# Patient Record
Sex: MALE | Race: WHITE
[De-identification: names, ages, dates, MRNs, and addresses within clinical notes are randomized per-mention and may not be internally consistent; named-entity substitution may affect disease eponyms.]

---

## 2017-07-11 ENCOUNTER — HOSPITAL ENCOUNTER (EMERGENCY)
Dept: HOSPITAL 62 - ER | Age: 67
Discharge: HOME | End: 2017-07-11
Payer: COMMERCIAL

## 2017-07-11 VITALS — DIASTOLIC BLOOD PRESSURE: 79 MMHG | SYSTOLIC BLOOD PRESSURE: 140 MMHG
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
## 2017-07-11 DIAGNOSIS — M54.5: ICD-10-CM

## 2017-07-11 DIAGNOSIS — S10.93XA: Primary | ICD-10-CM

## 2017-07-11 LAB
ALBUMIN SERPL-MCNC: 4.5 G/DL (ref 3.5–5)
ALP SERPL-CCNC: 94 U/L (ref 38–126)
ALT SERPL-CCNC: 38 U/L (ref 21–72)
ANION GAP SERPL CALC-SCNC: 12 MMOL/L (ref 5–19)
AST SERPL-CCNC: 32 U/L (ref 17–59)
BASOPHILS # BLD AUTO: 0 10^3/UL (ref 0–0.2)
BASOPHILS NFR BLD AUTO: 0.3 % (ref 0–2)
BILIRUB DIRECT SERPL-MCNC: 0.3 MG/DL (ref 0–0.4)
BILIRUB SERPL-MCNC: 0.6 MG/DL (ref 0.2–1.3)
BUN SERPL-MCNC: 24 MG/DL (ref 7–20)
CALCIUM: 9.6 MG/DL (ref 8.4–10.2)
CHLORIDE SERPL-SCNC: 103 MMOL/L (ref 98–107)
CO2 SERPL-SCNC: 24 MMOL/L (ref 22–30)
CREAT SERPL-MCNC: 1.11 MG/DL (ref 0.52–1.25)
EOSINOPHIL # BLD AUTO: 0.4 10^3/UL (ref 0–0.6)
EOSINOPHIL NFR BLD AUTO: 3.8 % (ref 0–6)
ERYTHROCYTE [DISTWIDTH] IN BLOOD BY AUTOMATED COUNT: 13.9 % (ref 11.5–14)
GLUCOSE SERPL-MCNC: 163 MG/DL (ref 75–110)
HCT VFR BLD CALC: 39.2 % (ref 37.9–51)
HGB BLD-MCNC: 13.2 G/DL (ref 13.5–17)
HGB HCT DIFFERENCE: 0.4
LYMPHOCYTES # BLD AUTO: 0.9 10^3/UL (ref 0.5–4.7)
LYMPHOCYTES NFR BLD AUTO: 8.8 % (ref 13–45)
MCH RBC QN AUTO: 29.8 PG (ref 27–33.4)
MCHC RBC AUTO-ENTMCNC: 33.7 G/DL (ref 32–36)
MCV RBC AUTO: 88 FL (ref 80–97)
MONOCYTES # BLD AUTO: 0.7 10^3/UL (ref 0.1–1.4)
MONOCYTES NFR BLD AUTO: 6.8 % (ref 3–13)
NEUTROPHILS # BLD AUTO: 8.6 10^3/UL (ref 1.7–8.2)
NEUTS SEG NFR BLD AUTO: 80.3 % (ref 42–78)
POTASSIUM SERPL-SCNC: 4.7 MMOL/L (ref 3.6–5)
PROT SERPL-MCNC: 8.1 G/DL (ref 6.3–8.2)
RBC # BLD AUTO: 4.44 10^6/UL (ref 4.35–5.55)
SODIUM SERPL-SCNC: 138.5 MMOL/L (ref 137–145)
WBC # BLD AUTO: 10.7 10^3/UL (ref 4–10.5)

## 2017-07-11 PROCEDURE — 70450 CT HEAD/BRAIN W/O DYE: CPT

## 2017-07-11 PROCEDURE — 72125 CT NECK SPINE W/O DYE: CPT

## 2017-07-11 PROCEDURE — 71260 CT THORAX DX C+: CPT

## 2017-07-11 PROCEDURE — 74177 CT ABD & PELVIS W/CONTRAST: CPT

## 2017-07-11 PROCEDURE — 80053 COMPREHEN METABOLIC PANEL: CPT

## 2017-07-11 PROCEDURE — 85025 COMPLETE CBC W/AUTO DIFF WBC: CPT

## 2017-07-11 PROCEDURE — 36415 COLL VENOUS BLD VENIPUNCTURE: CPT

## 2017-07-11 PROCEDURE — 99284 EMERGENCY DEPT VISIT MOD MDM: CPT
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
## 2018-04-04 ENCOUNTER — HOSPITAL ENCOUNTER (OUTPATIENT)
Dept: HOSPITAL 62 - SP | Age: 68
End: 2018-04-04
Attending: INTERNAL MEDICINE
Payer: MEDICARE

## 2018-04-04 DIAGNOSIS — R22.43: Primary | ICD-10-CM

## 2018-04-04 PROCEDURE — 93970 EXTREMITY STUDY: CPT

## 2018-04-04 NOTE — RADIOLOGY REPORT (SQ)
EXAM DESCRIPTION:  VENOUS BILATERAL LOWER



COMPLETED DATE/TIME:  4/4/2018 11:52 am



REASON FOR STUDY:  SWELLING R22.43  LOCALIZED SWELLING, MASS AND LUMP, LOWER LIMB, BILATE



COMPARISON:  None.



TECHNIQUE:  Dynamic and static gray scale and color images acquired of both lower extremity venous sy
stems. Selected spectral images acquired with additional compression and augmentation maneuvers. Imag
es stored on PACS.



LIMITATIONS:  None.



FINDINGS:  RIGHT LEG

COMMON FEMORAL AND FEMORAL: Normal phasicity, compression and augmentation. No visualized echogenic m
aterial on gray scale. No defects on color images.

POPLITEAL: Normal compression and augmentation. No visualized echogenic material on gray scale. No de
fects on color images.

CALF VESSELS: Normal compression and augmentation. No visualized echogenic material on gray scale. No
 defects on color image.

GSV AND SSV: Normal compression. No visualized echogenic material on gray scale. No defects on color 
images.

ANY DEEP VENOUS INSUFFICIENCY: Not evaluated.

ANY EVIDENCE OF POPLITEAL CYST: No.

OTHER: No other significant finding.

LEFT LEG

COMMON FEMORAL AND FEMORAL: Normal phasicity, compression and augmentation. No visualized echogenic m
aterial on gray scale. No defects on color images.

POPLITEAL: Normal compression and augmentation. No visualized echogenic material on gray scale. No de
fects on color images.

CALF VESSELS: Normal compression and augmentation. No visualized echogenic material on gray scale. No
 defects on color images.

GSV AND SSV: Normal compression. No visualized echogenic material on gray scale. No defects on color 
images.

ANY DEEP VENOUS INSUFFICIENCY: Not evaluated.

ANY EVIDENCE POPLITEAL CYST: No.

OTHER: No other significant finding.



IMPRESSION:  NO EVIDENCE DVT OR SVT IN EITHER LEG.



TECHNICAL DOCUMENTATION:  JOB ID:  [id number]

 2011 [employer]- All Rights Reserved



Reading location - IP/workstation name: [email or web address]

## 2018-07-08 ENCOUNTER — HOSPITAL ENCOUNTER (EMERGENCY)
Dept: HOSPITAL 62 - ER | Age: 68
Discharge: HOME | End: 2018-07-08
Payer: MEDICARE

## 2018-07-08 VITALS — SYSTOLIC BLOOD PRESSURE: 93 MMHG | DIASTOLIC BLOOD PRESSURE: 77 MMHG

## 2018-07-08 DIAGNOSIS — L27.0: Primary | ICD-10-CM

## 2018-07-08 DIAGNOSIS — I25.10: ICD-10-CM

## 2018-07-08 DIAGNOSIS — R06.02: ICD-10-CM

## 2018-07-08 DIAGNOSIS — E10.9: ICD-10-CM

## 2018-07-08 DIAGNOSIS — Z87.891: ICD-10-CM

## 2018-07-08 DIAGNOSIS — I50.9: ICD-10-CM

## 2018-07-08 DIAGNOSIS — I11.0: ICD-10-CM

## 2018-07-08 DIAGNOSIS — T36.4X5A: ICD-10-CM

## 2018-07-08 LAB
ADD MANUAL DIFF: NO
ALBUMIN SERPL-MCNC: 4.2 G/DL (ref 3.5–5)
ALP SERPL-CCNC: 110 U/L (ref 38–126)
ALT SERPL-CCNC: 20 U/L (ref 21–72)
ANION GAP SERPL CALC-SCNC: 17 MMOL/L (ref 5–19)
AST SERPL-CCNC: 33 U/L (ref 17–59)
BASOPHILS # BLD AUTO: 0 10^3/UL (ref 0–0.2)
BASOPHILS NFR BLD AUTO: 0.3 % (ref 0–2)
BILIRUB DIRECT SERPL-MCNC: 1 MG/DL (ref 0–0.4)
BILIRUB SERPL-MCNC: 1.5 MG/DL (ref 0.2–1.3)
BUN SERPL-MCNC: 96 MG/DL (ref 7–20)
CALCIUM: 9.9 MG/DL (ref 8.4–10.2)
CHLORIDE SERPL-SCNC: 100 MMOL/L (ref 98–107)
CO2 SERPL-SCNC: 23 MMOL/L (ref 22–30)
EOSINOPHIL # BLD AUTO: 0.1 10^3/UL (ref 0–0.6)
EOSINOPHIL NFR BLD AUTO: 1.5 % (ref 0–6)
ERYTHROCYTE [DISTWIDTH] IN BLOOD BY AUTOMATED COUNT: 20 % (ref 11.5–14)
GLUCOSE SERPL-MCNC: 188 MG/DL (ref 75–110)
HCT VFR BLD CALC: 38.8 % (ref 37.9–51)
HGB BLD-MCNC: 12.9 G/DL (ref 13.5–17)
LYMPHOCYTES # BLD AUTO: 0.5 10^3/UL (ref 0.5–4.7)
LYMPHOCYTES NFR BLD AUTO: 5.1 % (ref 13–45)
MCH RBC QN AUTO: 28 PG (ref 27–33.4)
MCHC RBC AUTO-ENTMCNC: 33.2 G/DL (ref 32–36)
MCV RBC AUTO: 84 FL (ref 80–97)
MONOCYTES # BLD AUTO: 0.5 10^3/UL (ref 0.1–1.4)
MONOCYTES NFR BLD AUTO: 5.5 % (ref 3–13)
NEUTROPHILS # BLD AUTO: 8.2 10^3/UL (ref 1.7–8.2)
NEUTS SEG NFR BLD AUTO: 87.6 % (ref 42–78)
PLATELET # BLD: 168 10^3/UL (ref 150–450)
POTASSIUM SERPL-SCNC: 4.8 MMOL/L (ref 3.6–5)
PROT SERPL-MCNC: 9 G/DL (ref 6.3–8.2)
RBC # BLD AUTO: 4.59 10^6/UL (ref 4.35–5.55)
SODIUM SERPL-SCNC: 139.8 MMOL/L (ref 137–145)
TOTAL CELLS COUNTED % (AUTO): 100 %
WBC # BLD AUTO: 9.4 10^3/UL (ref 4–10.5)

## 2018-07-08 PROCEDURE — 71045 X-RAY EXAM CHEST 1 VIEW: CPT

## 2018-07-08 PROCEDURE — 85025 COMPLETE CBC W/AUTO DIFF WBC: CPT

## 2018-07-08 PROCEDURE — 36415 COLL VENOUS BLD VENIPUNCTURE: CPT

## 2018-07-08 PROCEDURE — 96374 THER/PROPH/DIAG INJ IV PUSH: CPT

## 2018-07-08 PROCEDURE — 80053 COMPREHEN METABOLIC PANEL: CPT

## 2018-07-08 PROCEDURE — 96361 HYDRATE IV INFUSION ADD-ON: CPT

## 2018-07-08 PROCEDURE — 99284 EMERGENCY DEPT VISIT MOD MDM: CPT

## 2018-07-08 NOTE — RADIOLOGY REPORT (SQ)
EXAM DESCRIPTION:  CHEST SINGLE VIEW



COMPLETED DATE/TIME:  7/8/2018 4:46 pm



REASON FOR STUDY:  SOB



COMPARISON:  7/11/2017



EXAM PARAMETERS:  NUMBER OF VIEWS: One view.

TECHNIQUE: Single frontal radiographic view of the chest acquired.

RADIATION DOSE: NA

LIMITATIONS: None.



FINDINGS:  LUNGS AND PLEURA: Blunting and hazy opacification of the left costophrenic angle.  No foca
l consolidation.  No pneumothorax.

MEDIASTINUM AND HILAR STRUCTURES: No masses.  Contour normal.

HEART AND VASCULAR STRUCTURES: Borderline cardiomegaly.  Central vasculature appears normal.

BONES: No acute findings.

HARDWARE: None in the chest.

OTHER: No other significant finding.



IMPRESSION:  Left lung base findings are nonspecific, and may represent a developing pneumonitis, sca
rring, and/or a small pleural effusion.



TECHNICAL DOCUMENTATION:  JOB ID:  [id number]

 2011 [employer]- All Rights Reserved



Reading location - IP/workstation name: [email or web address]

## 2018-07-08 NOTE — ER DOCUMENT REPORT
ED General





- General


Chief Complaint: Allergic Reaction


Stated Complaint: POSSIBLE ALLERGIC REACTION


Time Seen by Provider: 07/08/18 15:58


Mode of Arrival: Ambulatory


Information source: Patient, Relative, Dr. Office, [facility] Records


Notes: 





67-year-old male with congestive heart failure, coronary artery disease diabetes

, hypertension presents with complaint of rash that started 1 day prior to 

arrival.  Patient was placed on doxycycline for urinary tract infection 5 days 

prior to arrival.  Daughter is at the bedside and states that yesterday patient 

developed a rash on his chest, back and upper legs.  She immediately stopped 

the antibiotic and administered Benadryl.  Today the rash worsened and patient 

had associated shortness of breath which has now resolved.  Patient's urologist 

Dr. Brown advised to be seen in the emergency department.  Patient was now 

placed on Bactrim.  Patient denies difficulty swallowing, chest and abdominal 

pain.  Patient is due to be admitted at [facility] tomorrow in preparation for an LVAD.


TRAVEL OUTSIDE OF THE [place] IN LAST 30 DAYS: No





- HPI


Onset: Yesterday


Onset/Duration: Gradual, Persistent, Worse


Quality of pain: No pain


Severity: None


Associated symptoms: Shortness of breath


Exacerbated by: Denies


Relieved by: Denies


Similar symptoms previously: No


Recently seen / treated by doctor: Yes





- Related Data


Allergies/Adverse Reactions: 


 





doxycycline Allergy (Verified 07/08/18 16:39)


 











Past Medical History





- General


Information source: Patient, Relative, Dr. Office, [facility] Records





- Social History


Smoking Status: Former Smoker


Chew tobacco use (# tins/day): No


Frequency of alcohol use: None


Drug Abuse: None


Lives with: Family


Family History: Reviewed & Not Pertinent


Patient has suicidal ideation: No


Patient has homicidal ideation: No





- Past Medical History


Cardiac Medical History: Reports: Hx Congestive Heart Failure, Hx Coronary 

Artery Disease, Hx Hypertension


Endocrine Medical History: Reports: Hx Diabetes Mellitus Type 1


Renal/ Medical History: Denies: Hx Peritoneal Dialysis





- Immunizations


Hx Diphtheria, Pertussis, Tetanus Vaccination: No


Hx Pneumococcal Vaccination: 08/21/13





Review of Systems





- Review of Systems


Constitutional: Malaise, Weakness - Ongoing


EENT: No symptoms reported


Cardiovascular: denies: Syncope


Respiratory: Short of breath - Resolved, Wheezing


Gastrointestinal: No symptoms reported


Genitourinary: No symptoms reported


Male Genitourinary: No symptoms reported


Musculoskeletal: No symptoms reported


Skin: Rash


Hematologic/Lymphatic: No symptoms reported


Neurological/Psychological: denies: Lost consciousness, Headaches


-: Yes All other systems reviewed and negative





Physical Exam





- Vital signs


Vitals: 


 











Pulse Resp BP Pulse Ox


 


 107 H  16   93/65 L  100 


 


 07/08/18 15:41  07/08/18 15:41  07/08/18 15:41  07/08/18 15:41














- Notes


Notes: 





PHYSICAL EXAMINATION:





GENERAL: Well-appearing, well-nourished and in no acute distress.





HEAD: Atraumatic, normocephalic.





EYES: Pupils equal round and reactive to light, extraocular movements intact, 

sclera anicteric, conjunctiva are normal.





ENT: Nares patent, oropharynx clear without exudates.  Moist mucous membranes.  

No oral lesions 





NECK: Normal range of motion, supple without lymphadenopathy





LUNGS: Breath sounds clear to auscultation bilaterally and equal.  No wheezes 

rales or rhonchi.





HEART: Regular rate and rhythm without murmurs





ABDOMEN: Soft, nontender, nondistended abdomen.  No guarding, no rebound.  No 

masses appreciated.





Musculoskeletal: Normal range of motion, no pitting or edema.  No cyanosis.





NEUROLOGICAL: Cranial nerves grossly intact.  Normal speech, normal gait.  

Normal sensory, motor exams 





PSYCH: Normal mood, normal affect.





SKIN: Diffuse urticaria of the trunk, consistent with drug rash, no skin 

sloughing





Course





- Re-evaluation


Re-evalutation: 








Laboratory











  07/08/18 07/08/18





  16:10 16:10


 


WBC  9.4 


 


RBC  4.59 


 


Hgb  12.9 L 


 


Hct  38.8 


 


MCV  84 


 


MCH  28.0 


 


MCHC  33.2 


 


RDW  20.0 H 


 


Plt Count  168 


 


Seg Neutrophils %  87.6 H 


 


Lymphocytes %  5.1 L 


 


Monocytes %  5.5 


 


Eosinophils %  1.5 


 


Basophils %  0.3 


 


Absolute Neutrophils  8.2 


 


Absolute Lymphocytes  0.5 


 


Absolute Monocytes  0.5 


 


Absolute Eosinophils  0.1 


 


Absolute Basophils  0.0 


 


Sodium   139.8


 


Potassium   4.8


 


Chloride   100


 


Carbon Dioxide   23


 


Anion Gap   17


 


BUN   96 H


 


Creatinine   1.26 H


 


Est GFR ( Amer)   > 60


 


Est GFR (Non-Af Amer)   57 L


 


Glucose   188 H


 


Calcium   9.9


 


Total Bilirubin   1.5 H


 


Direct Bilirubin   1.0 H


 


Neonat Total Bilirubin   Not Reportable


 


Neonat Direct Bilirubin   Not Reportable


 


Neonat Indirect Bili   Not Reportable


 


AST   33


 


ALT   20 L


 


Alkaline Phosphatase   110


 


Total Protein   9.0 H


 


Albumin   4.2














 





Chest X-Ray  07/08/18 16:15


IMPRESSION:  Left lung base findings are nonspecific, and may represent a 

developing pneumonitis, scarring, and/or a small pleural effusion.


 











07/08/18 22:51


67-year-old male with congestive heart failure, coronary artery disease diabetes

, hypertension presents with complaint of rash that started 1 day prior to 

arrival.  Patient was placed on doxycycline for urinary tract infection 5 days 

prior to arrival.  Daughter is at the bedside and states that yesterday patient 

developed a rash on his chest, back and upper legs.  She immediately stopped 

the antibiotic and administered Benadryl.  Today the rash worsened and patient 

had associated shortness of breath which has now resolved.  Patient's urologist 

Dr. Brown advised to be seen in the emergency department.  Patient was now 

placed on Bactrim.  Patient denies difficulty swallowing, chest and abdominal 

pain.  Patient is due to be admitted at [facility] tomorrow in preparation for an 

LVAD.  Patient received Benadryl, prednisone, IV fluids during his ED course.  

On reevaluation he reports an improvement in his shortness of breath.  Rash is 

still present.  No evidence of Maier-Jimmie's.  Patient provided the 

opportunity to ask questions, and express concerns.  Discharge instructions 

discussed. Patient is agreeable with discharge home.  Return indications 

explained and discussed with the patient who displays understanding.  Patient 

encouraged to return to the emergency department immediately with any concerns.


07/08/18 22:52








- Vital Signs


Vital signs: 


 











Temp Pulse Resp BP Pulse Ox


 


    107 H  19   93/77 L  92 


 


    07/08/18 15:41  07/08/18 18:01  07/08/18 18:01  07/08/18 18:01














- Laboratory


Result Diagrams: 


 07/08/18 16:10





 07/08/18 16:10


Laboratory results interpreted by me: 


 











  07/08/18 07/08/18





  16:10 16:10


 


Hgb  12.9 L 


 


RDW  20.0 H 


 


Seg Neutrophils %  87.6 H 


 


Lymphocytes %  5.1 L 


 


BUN   96 H


 


Creatinine   1.26 H


 


Est GFR (Non-Af Amer)   57 L


 


Glucose   188 H


 


Total Bilirubin   1.5 H


 


Direct Bilirubin   1.0 H


 


ALT   20 L


 


Total Protein   9.0 H














- Diagnostic Test


Radiology reviewed: Image reviewed, Reports reviewed





Discharge





- Discharge


Clinical Impression: 


 Drug rash





Allergic reaction


Qualifiers:


 Encounter type: initial encounter Qualified Code(s): T78.40XA - Allergy, 

unspecified, initial encounter





Condition: Good


Disposition: HOME, SELF-CARE


Instructions:  Acute Allergic Reaction ([facility]), Acute Allergic Reaction to Drugs (

[facility])


Additional Instructions: 


Follow up with your physician tomorrow for further care or return to the ED 

IMMEDIATELY if symptoms worsen or new concerns occur. If you cannot afford to 

follow up with your primary care physician a list of low cost clinics have been 

provided at the end of your discharge papers as well.


Referrals: 


FELICIA TRUJILLO MD [Primary Care Provider] - Follow up as needed

## 2018-07-08 NOTE — ER DOCUMENT REPORT
ED Medical Screen (RME)





- General


Chief Complaint: Allergic Reaction


Stated Complaint: POSSIBLE ALLERGIC REACTION


Time Seen by Provider: 07/08/18 15:58


Notes: 


Patient is a 67-year-old male presents with 1 day of a diffuse rash on his 

chest wall, back and abdomen after starting doxycycline for a UTI.  He took 

Benadryl 6 hours ago.  [facility]  Denies 

palms, mouth involvement or foot involvement.





PE: Diffuse macular rash. No desquamation of hands or mouth. Tachycardia.





I have greeted and performed a rapid initial assessment of this patient.  A 

comprehensive ED assessment and evaluation of the patient, analysis of test 

results and completion of the medical decision making process will be conducted 

by additional ED providers.


TRAVEL OUTSIDE OF THE [place] IN LAST 30 DAYS: No





- Related Data


Allergies/Adverse Reactions: 


 





No Known Allergies Allergy (Verified 07/08/18 15:36)


 











Past Medical History





- Social History


Chew tobacco use (# tins/day): No


Frequency of alcohol use: None


Drug Abuse: None





- Past Medical History


Cardiac Medical History: Reports: Hx Congestive Heart Failure


Renal/ Medical History: Denies: Hx Peritoneal Dialysis





- Immunizations


Hx Diphtheria, Pertussis, Tetanus Vaccination: No





Physical Exam





- Vital signs


Vitals: 


 











Pulse Resp BP Pulse Ox


 


 107 H  16   93/65 L  100 


 


 07/08/18 15:41  07/08/18 15:41  07/08/18 15:41  07/08/18 15:41














Course





- Vital Signs


Vital signs: 


 











Temp Pulse Resp BP Pulse Ox


 


    107 H  16   93/65 L  100 


 


    07/08/18 15:41  07/08/18 15:41  07/08/18 15:41  07/08/18 15:41














Doctor's Discharge





- Discharge


Referrals: 


FELICIA TRUJILLO MD [Primary Care Provider] - Follow up as needed

## 2018-08-04 ENCOUNTER — HOSPITAL ENCOUNTER (EMERGENCY)
Dept: HOSPITAL 62 - ER | Age: 68
Discharge: ANOTHER HOSPITAL | End: 2018-08-04
Payer: MEDICARE

## 2018-08-04 VITALS — SYSTOLIC BLOOD PRESSURE: 93 MMHG | DIASTOLIC BLOOD PRESSURE: 66 MMHG

## 2018-08-04 DIAGNOSIS — I25.10: ICD-10-CM

## 2018-08-04 DIAGNOSIS — N17.9: ICD-10-CM

## 2018-08-04 DIAGNOSIS — I11.0: Primary | ICD-10-CM

## 2018-08-04 DIAGNOSIS — Z88.1: ICD-10-CM

## 2018-08-04 DIAGNOSIS — I50.9: ICD-10-CM

## 2018-08-04 DIAGNOSIS — R53.1: ICD-10-CM

## 2018-08-04 DIAGNOSIS — R06.02: ICD-10-CM

## 2018-08-04 DIAGNOSIS — Z95.810: ICD-10-CM

## 2018-08-04 LAB
ADD MANUAL DIFF: NO
ANION GAP SERPL CALC-SCNC: 20 MMOL/L (ref 5–19)
BASOPHILS # BLD AUTO: 0 10^3/UL (ref 0–0.2)
BASOPHILS NFR BLD AUTO: 0.6 % (ref 0–2)
BUN SERPL-MCNC: 139 MG/DL (ref 7–20)
CALCIUM: 9.3 MG/DL (ref 8.4–10.2)
CHLORIDE SERPL-SCNC: 94 MMOL/L (ref 98–107)
CO2 SERPL-SCNC: 21 MMOL/L (ref 22–30)
EOSINOPHIL # BLD AUTO: 0.2 10^3/UL (ref 0–0.6)
EOSINOPHIL NFR BLD AUTO: 2.1 % (ref 0–6)
ERYTHROCYTE [DISTWIDTH] IN BLOOD BY AUTOMATED COUNT: 21.1 % (ref 11.5–14)
GLUCOSE SERPL-MCNC: 137 MG/DL (ref 75–110)
HCT VFR BLD CALC: 32.7 % (ref 37.9–51)
HGB BLD-MCNC: 10.6 G/DL (ref 13.5–17)
LYMPHOCYTES # BLD AUTO: 0.4 10^3/UL (ref 0.5–4.7)
LYMPHOCYTES NFR BLD AUTO: 5.2 % (ref 13–45)
MCH RBC QN AUTO: 28.9 PG (ref 27–33.4)
MCHC RBC AUTO-ENTMCNC: 32.5 G/DL (ref 32–36)
MCV RBC AUTO: 89 FL (ref 80–97)
MONOCYTES # BLD AUTO: 0.7 10^3/UL (ref 0.1–1.4)
MONOCYTES NFR BLD AUTO: 8.8 % (ref 3–13)
NEUTROPHILS # BLD AUTO: 6.3 10^3/UL (ref 1.7–8.2)
NEUTS SEG NFR BLD AUTO: 83.3 % (ref 42–78)
PLATELET # BLD: 157 10^3/UL (ref 150–450)
POTASSIUM SERPL-SCNC: 5.4 MMOL/L (ref 3.6–5)
RBC # BLD AUTO: 3.68 10^6/UL (ref 4.35–5.55)
SODIUM SERPL-SCNC: 135 MMOL/L (ref 137–145)
TOTAL CELLS COUNTED % (AUTO): 100 %
TROPONIN I SERPL-MCNC: 0.07 NG/ML
WBC # BLD AUTO: 7.5 10^3/UL (ref 4–10.5)

## 2018-08-04 PROCEDURE — S0119 ONDANSETRON 4 MG: HCPCS

## 2018-08-04 PROCEDURE — 84484 ASSAY OF TROPONIN QUANT: CPT

## 2018-08-04 PROCEDURE — 82962 GLUCOSE BLOOD TEST: CPT

## 2018-08-04 PROCEDURE — 99285 EMERGENCY DEPT VISIT HI MDM: CPT

## 2018-08-04 PROCEDURE — 85025 COMPLETE CBC W/AUTO DIFF WBC: CPT

## 2018-08-04 PROCEDURE — 36415 COLL VENOUS BLD VENIPUNCTURE: CPT

## 2018-08-04 PROCEDURE — 80048 BASIC METABOLIC PNL TOTAL CA: CPT

## 2018-08-04 PROCEDURE — 83880 ASSAY OF NATRIURETIC PEPTIDE: CPT

## 2018-08-04 PROCEDURE — 93010 ELECTROCARDIOGRAM REPORT: CPT

## 2018-08-04 PROCEDURE — 93005 ELECTROCARDIOGRAM TRACING: CPT

## 2018-08-04 PROCEDURE — 71046 X-RAY EXAM CHEST 2 VIEWS: CPT

## 2018-08-04 NOTE — RADIOLOGY REPORT (SQ)
EXAM DESCRIPTION:  CHEST 2 VIEWS



COMPLETED DATE/TIME:  8/4/2018 1:46 pm



REASON FOR STUDY:  11, sob; on dobutamine drip 2/2 heart failure



COMPARISON:  None.



EXAM PARAMETERS:  NUMBER OF VIEWS: two views

TECHNIQUE: Digital Frontal and Lateral radiographic views of the chest acquired.

RADIATION DOSE: NA

LIMITATIONS: none



FINDINGS:  LUNGS AND PLEURA: Interval decrease in bibasilar infiltrates or atelectasis with effusions
.

MEDIASTINUM AND HILAR STRUCTURES: No masses or contour abnormalities.

HEART AND VASCULAR STRUCTURES: Borderline cardiac size.  Interval decrease in pulmonary vascular claribel
estion.

BONES: Dorsal spondylosis.

HARDWARE: Left transvenous unipolar pacemaker lead in place.

OTHER: Right central venous line with tip overlying SVC.



IMPRESSION:  Improved aeration in both lung bases with persistent bilateral pleural effusions.  Cardi
omegaly.  Decrease in pulmonary vascular congestion.



TECHNICAL DOCUMENTATION:  JOB ID:  [id number]

[id number]

 2011 [email or web address]- All Rights Reserved



Reading location - IP/workstation name: [email or web address]

## 2018-08-04 NOTE — ER DOCUMENT REPORT
ED General





- General


Chief Complaint: General Weakness


Stated Complaint: WEAKNESS


Time Seen by Provider: 08/04/18 13:05


Information source: Patient, Emergency Med Personnel


Notes: 





Patient is a very kind 67-year-old male with an extensive past medical history 

including a recent injection fraction calculation around 20% with discharge 

from [facility] around 5 days ago after defibrillator was placed.  The 

patient was actually discharged with port access IV drip dobutamine.  Patient 

presents today because he states he has had some increased shortness of breath 

when laying flat as well as some bilateral lower extremity edema that he states 

has developed since being discharged from the hospital.  He denies any and all 

chest pain, fevers, cough, abdominal pain or swelling, or vomiting/diarrhea.  

Patient states he was told that if this trial was not successful that the 

patient was most likely be transferred to an LVAD.


TRAVEL OUTSIDE OF THE [place] IN LAST 30 DAYS: No





- HPI


Onset: Other - See above


Onset/Duration: Gradual


Quality of pain: No pain


Severity: Moderate


Pain Level: Denies


Associated symptoms: Weakness, Other - See above


Exacerbated by: Supine, Walking


Relieved by: Denies


Similar symptoms previously: Yes


Recently seen / treated by doctor: Yes





- Related Data


Allergies/Adverse Reactions: 


 





doxycycline Allergy (Verified 07/08/18 16:39)


 











Past Medical History





- General


Information source: Patient





- Social History


Smoking Status: Unknown if Ever Smoked


Cigarette use (# per day): No


Chew tobacco use (# tins/day): No


Smoking Education Provided: No


Frequency of alcohol use: None


Drug Abuse: None


Family History: Reviewed & Not Pertinent





- Past Medical History


Cardiac Medical History: Reports: Hx Congestive Heart Failure, Hx Coronary 

Artery Disease, Hx Hypertension


Endocrine Medical History: Reports: Hx Diabetes Mellitus Type 1


Renal/ Medical History: Denies: Hx Peritoneal Dialysis





- Immunizations


Hx Diphtheria, Pertussis, Tetanus Vaccination: No


Hx Pneumococcal Vaccination: 08/21/13





Review of Systems





- Review of Systems


Constitutional: denies: Fever


EENT: denies: Eye discharge, Nose discharge


Cardiovascular: denies: Chest pain, Palpitations


Respiratory: Short of breath.  denies: Cough, Hemoptysis, Wheezing


Gastrointestinal: denies: Vomiting


Genitourinary: denies: Dysuria


Skin: Other - no hives.  denies: Rash


Neurological/Psychological: Other - no slurred speech


-: Yes All other systems reviewed and negative





Physical Exam





- Vital signs


Vitals: 


 











Resp BP Pulse Ox


 


 20   97/64 L  100 


 


 08/04/18 13:03  08/04/18 13:03  08/04/18 13:03











Notes: 





Reviewed vital signs and nursing note as charted by RN.





CONSTITUTIONAL: Alert and oriented and responds appropriately to questions. Well

-appearing; well-nourished





HEAD: Normocephalic; atraumatic





EYES: PERRL; Conjunctivae clear, sclerae non-icteric





ENT: Normal nose; no rhinorrhea; moist mucous membranes; pharynx without 

lesions noted





NECK: Supple without meningismus; non-tender





CARD: Regular rate and rhythm; no murmurs; symmetric distal pulses





RESP: Normal chest excursion without splinting or tachypnea; breath sounds 

clear and equal bilaterally; no rhonchi or wheezes.  Some mild bilateral lower 

lung [place]





ABD/GI: Normal bowel sounds; non-distended; soft, non-tender





BACK: The back appears normal and is non-tender to palpation





EXT: Normal ROM in all joints; non-tender to palpation; no edema





SKIN: No acute lesions noted





NEURO: Moves all extremities equally; Motor and sensory function intact





PSYCH: The patient's mood and manner are appropriate. Grooming and personal 

hygiene are appropriate





Course





- Re-evaluation


Re-evalutation: 





08/04/18 13:16


Given the above history and physical examination, we will place the patient on 

the monitor.  Vital signs are stable with a blood pressure of 95/57.  Patient's 

dobutamine pump still in place so we will not manipulate this at this time.  I 

have asked the patient repeatedly if he feels as if the shortness of breath 

when supine something new since being discharged.  He states that it is.  I 

have questioned him multiple times whether he believes this is anxiety related 

or more just the shortness of breath from his heart failure.  He states given 

the leg swelling, with a worsening shortness of breath when reclined, he 

believes that this is exacerbation of his shortness of breath.





EKG shows heart of 94, normal sinus rhythm, left bundle branch block.  Patient 

states he has a history of left bundle branch block.








08/04/18 15:25


Vital signs are stable.  BNP is recorded.  It appears the patient has acute 

renal failure.  This is compared to a creatinine of 1.211 month ago.  I have 

called [facility] for consultation/transfer.  Vital signs are stable.  Initial 

troponin as recorded.  Patient still denies any pain.





X-ray of the chest on my preliminary interpretation shows persistent 

cardiomegaly with some increased lung aeration.  No obvious infiltrates with 

bilateral effusions.





08/04/18 15:38


I called and spoke to Dr. Cavazos.  I have explained the full history and 

physical including the laboratory values.  He actually states he does not want 

me to give fluid at this time.  I have discussed the potassium of 5.4 and he is 

recommended a dose of Kayexalate.  They have accepted the transport for 

admission at [facility]. 





- Vital Signs


Vital signs: 


 











Temp Pulse Resp BP Pulse Ox


 


 97.2 F   96   0 L  99/64 L  95 


 


 08/04/18 13:08  08/04/18 13:08  08/04/18 14:30  08/04/18 14:30  08/04/18 14:30














- Laboratory


Result Diagrams: 


 08/04/18 13:53





 08/04/18 13:53


Laboratory results interpreted by me: 


 











  08/04/18 08/04/18





  13:53 13:53


 


Sodium  135.0 L 


 


Potassium  5.4 H 


 


Chloride  94 L 


 


Carbon Dioxide  21 L 


 


Anion Gap  20 H 


 


BUN  139 H 


 


Creatinine  2.51 H 


 


Est GFR ( Amer)  31 L 


 


Est GFR (Non-Af Amer)  26 L 


 


Glucose  137 H 


 


NT-Pro-B Natriuret Pep   31216 H














Discharge





- Discharge


Clinical Impression: 


 Acute decompensated heart failure





Acute renal failure


Qualifiers:


 Acute renal failure type: unspecified Qualified Code(s): N17.9 - Acute kidney 

failure, unspecified





Condition: Serious


Disposition: [place]


Referrals: 


FELICIA TRUJILLO MD [Primary Care Provider] - Follow up as needed

## 2018-08-05 NOTE — EKG REPORT
SEVERITY:- ABNORMAL ECG -

SINUS RHYTHM

PROBABLE LEFT ATRIAL ABNORMALITY

LEFT BUNDLE BRANCH BLOCK

:

Confirmed by: Luigi Aguirre 05-Aug-2018 10:01:20